# Patient Record
Sex: FEMALE | Race: WHITE
[De-identification: names, ages, dates, MRNs, and addresses within clinical notes are randomized per-mention and may not be internally consistent; named-entity substitution may affect disease eponyms.]

---

## 2019-04-01 ENCOUNTER — HOSPITAL ENCOUNTER (EMERGENCY)
Dept: HOSPITAL 25 - UCEAST | Age: 24
Discharge: HOME | End: 2019-04-01
Payer: COMMERCIAL

## 2019-04-01 VITALS — DIASTOLIC BLOOD PRESSURE: 70 MMHG | SYSTOLIC BLOOD PRESSURE: 105 MMHG

## 2019-04-01 DIAGNOSIS — Z76.0: Primary | ICD-10-CM

## 2019-04-01 PROCEDURE — G0463 HOSPITAL OUTPT CLINIC VISIT: HCPCS

## 2019-04-01 PROCEDURE — 99212 OFFICE O/P EST SF 10 MIN: CPT

## 2019-04-01 NOTE — UC
General HPI





- HPI Summary


HPI Summary: 





Katelyn has been taking klonopin for 6 years. She ran out because she has been 

taking them once a day instead of the prescribed twice and hasn't filled it 

since 1/31.  She has an appointment n the 9th. 





- History of Current Complaint


Chief Complaint: UCMedRefill


Stated Complaint: MED REFILL


Pain Intensity: 0





- Allergy/Home Medications


Allergies/Adverse Reactions: 


 Allergies











Allergy/AdvReac Type Severity Reaction Status Date / Time


 


amoxicillin Allergy  Hives Verified 04/01/19 10:11











Home Medications: 


 Home Medications





Bupropion XL* [Wellbutrin XL *] 300 mg PO DAILY 04/01/19 [History Confirmed 04/ 01/19]


clonazePAM [Clonazepam] 0.5 mg PO 04/01/19 [History]











PMH/Surg Hx/FS Hx/Imm Hx


Previously Healthy: Yes





- Surgical History


Surgical History: None





- Social History


Alcohol Use: Weekly


Substance Use Type: None


Smoking Status (MU): Never Smoked Tobacco





- Immunization History


Most Recent Influenza Vaccination: none


Most Recent Tetanus Shot: unk


Most Recent Pneumonia Vaccination: none





Review of Systems


All Other Systems Reviewed And Are Negative: Yes


Constitutional: Positive: Negative





Physical Exam





- Summary


Physical Exam Summary: 





She is non-toxic n appearance and her vitals are stable.


Triage Information Reviewed: Yes


Appearance: Well-Appearing


Vital Signs: 


 Initial Vital Signs











Temp  96.9 F   04/01/19 10:08


 


Pulse  76   04/01/19 10:08


 


Resp  18   04/01/19 10:08


 


BP  105/70   04/01/19 10:08


 


Pulse Ox  100   04/01/19 10:08











Vital Signs Reviewed: Yes


Eye Exam: Normal


Respiratory Exam: Normal


Cardiovascular Exam: Normal


Neurological Exam: Normal


Psychological Exam: Normal





Course/Dx





- Course


Course Of Treatment: 





I am going to bridge her until her appointment.  I checked the i-stop and her 

story checks out.





- Diagnoses


Provider Diagnosis: 


 Medication refill








Discharge





- Sign-Out/Discharge


Documenting (check all that apply): Patient Departure


All imaging exams completed and their final reports reviewed: No Studies





- Discharge Plan


Condition: Stable


Disposition: HOME


Referrals: 


No Primary Care Phys,NOPCP [Primary Care Provider] - 


Additional Instructions: 


Please keep your appointment for April 9th.





- Billing Disposition and Condition


Condition: STABLE


Disposition: Home